# Patient Record
Sex: FEMALE | Race: OTHER
[De-identification: names, ages, dates, MRNs, and addresses within clinical notes are randomized per-mention and may not be internally consistent; named-entity substitution may affect disease eponyms.]

---

## 2018-01-30 ENCOUNTER — HOSPITAL ENCOUNTER (EMERGENCY)
Dept: HOSPITAL 54 - ER | Age: 58
Discharge: HOME | End: 2018-01-30
Payer: MEDICAID

## 2018-01-30 VITALS — SYSTOLIC BLOOD PRESSURE: 132 MMHG | DIASTOLIC BLOOD PRESSURE: 81 MMHG

## 2018-01-30 VITALS — WEIGHT: 235 LBS | BODY MASS INDEX: 40.12 KG/M2 | HEIGHT: 64 IN

## 2018-01-30 DIAGNOSIS — K29.70: ICD-10-CM

## 2018-01-30 DIAGNOSIS — J45.909: ICD-10-CM

## 2018-01-30 DIAGNOSIS — Z90.49: ICD-10-CM

## 2018-01-30 DIAGNOSIS — E87.6: ICD-10-CM

## 2018-01-30 DIAGNOSIS — F32.9: ICD-10-CM

## 2018-01-30 DIAGNOSIS — E11.9: ICD-10-CM

## 2018-01-30 DIAGNOSIS — R07.9: Primary | ICD-10-CM

## 2018-01-30 DIAGNOSIS — I10: ICD-10-CM

## 2018-01-30 LAB
ALBUMIN SERPL BCP-MCNC: 3.4 G/DL (ref 3.4–5)
ALP SERPL-CCNC: 131 U/L (ref 46–116)
ALT SERPL W P-5'-P-CCNC: 20 U/L (ref 12–78)
APTT PPP: 25 SEC (ref 23–34)
AST SERPL W P-5'-P-CCNC: 15 U/L (ref 15–37)
BASOPHILS # BLD AUTO: 0.1 /CMM (ref 0–0.2)
BASOPHILS NFR BLD AUTO: 1 % (ref 0–2)
BILIRUB SERPL-MCNC: 0.3 MG/DL (ref 0.2–1)
BUN SERPL-MCNC: 15 MG/DL (ref 7–18)
CALCIUM SERPL-MCNC: 9.4 MG/DL (ref 8.5–10.1)
CHLORIDE SERPL-SCNC: 105 MMOL/L (ref 98–107)
CO2 SERPL-SCNC: 25 MMOL/L (ref 21–32)
CREAT SERPL-MCNC: 0.6 MG/DL (ref 0.6–1.3)
EOSINOPHIL # BLD AUTO: 0.4 /CMM (ref 0–0.7)
EOSINOPHIL NFR BLD AUTO: 5.3 % (ref 0–6)
GLUCOSE SERPL-MCNC: 159 MG/DL (ref 74–106)
HCT VFR BLD AUTO: 41 % (ref 33–45)
HGB BLD-MCNC: 14 G/DL (ref 11.5–14.8)
INR PPP: 0.94 (ref 0.85–1.15)
LIPASE SERPL-CCNC: 131 U/L (ref 73–393)
LYMPHOCYTES NFR BLD AUTO: 2 /CMM (ref 0.8–4.8)
LYMPHOCYTES NFR BLD AUTO: 25 % (ref 20–44)
MCH RBC QN AUTO: 28 PG (ref 26–33)
MCHC RBC AUTO-ENTMCNC: 34 G/DL (ref 31–36)
MCV RBC AUTO: 84 FL (ref 82–100)
MONOCYTES NFR BLD AUTO: 0.6 /CMM (ref 0.1–1.3)
MONOCYTES NFR BLD AUTO: 7.1 % (ref 2–12)
NEUTROPHILS # BLD AUTO: 5.1 /CMM (ref 1.8–8.9)
NEUTROPHILS NFR BLD AUTO: 61.6 % (ref 43–81)
PLATELET # BLD AUTO: 255 /CMM (ref 150–450)
POTASSIUM SERPL-SCNC: 3 MMOL/L (ref 3.5–5.1)
PROT SERPL-MCNC: 7.1 G/DL (ref 6.4–8.2)
RBC # BLD AUTO: 4.93 MIL/UL (ref 4–5.2)
RDW COEFFICIENT OF VARIATION: 12.9 (ref 11.5–15)
SODIUM SERPL-SCNC: 140 MMOL/L (ref 136–145)
WBC NRBC COR # BLD AUTO: 8.2 K/UL (ref 4.3–11)

## 2018-01-30 PROCEDURE — A4606 OXYGEN PROBE USED W OXIMETER: HCPCS

## 2018-01-30 PROCEDURE — Z7610: HCPCS

## 2018-01-30 NOTE — NUR
AAOX3, BBRA FROM HOME FOR CHEST PAIN RADIATE TO HER BACK, SINCE 1130AM, [id number], 
NITROX1 GIVEN BY EMS BS-136MG/DL. RR IS EVEN AND UNLABORED WITH NAD NOTED. SKIN 
IS WARM AND DRY.AWAITING MD FOR EVAL.